# Patient Record
Sex: MALE | Race: WHITE | NOT HISPANIC OR LATINO | Employment: UNEMPLOYED | URBAN - METROPOLITAN AREA
[De-identification: names, ages, dates, MRNs, and addresses within clinical notes are randomized per-mention and may not be internally consistent; named-entity substitution may affect disease eponyms.]

---
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
## 2024-10-20 ENCOUNTER — HOSPITAL ENCOUNTER (EMERGENCY)
Facility: HOSPITAL | Age: 24
Discharge: HOME | End: 2024-10-20
Attending: EMERGENCY MEDICINE
Payer: COMMERCIAL

## 2024-10-20 VITALS
HEIGHT: 70 IN | DIASTOLIC BLOOD PRESSURE: 99 MMHG | WEIGHT: 190 LBS | RESPIRATION RATE: 18 BRPM | TEMPERATURE: 100 F | SYSTOLIC BLOOD PRESSURE: 153 MMHG | BODY MASS INDEX: 27.2 KG/M2 | HEART RATE: 115 BPM | OXYGEN SATURATION: 98 %

## 2024-10-20 DIAGNOSIS — B97.89 ACUTE VIRAL TONSILLITIS: Primary | ICD-10-CM

## 2024-10-20 DIAGNOSIS — J03.80 ACUTE VIRAL TONSILLITIS: Primary | ICD-10-CM

## 2024-10-20 PROCEDURE — 99283 EMERGENCY DEPT VISIT LOW MDM: CPT

## 2024-10-20 RX ORDER — DEXAMETHASONE SODIUM PHOSPHATE 4 MG/ML
10 INJECTION, SOLUTION INTRA-ARTICULAR; INTRALESIONAL; INTRAMUSCULAR; INTRAVENOUS; SOFT TISSUE ONCE
Status: COMPLETED | OUTPATIENT
Start: 2024-10-20 | End: 2024-10-20

## 2024-10-20 NOTE — ED INITIAL ASSESSMENT (HPI)
Pt arrives through triage with       complaints of sore throat that started a couple days ago. Pt was seen at  w/ neg strep. Advised to come to the Ed for possible abscess of tonsils. No fevers at home.

## 2024-10-21 NOTE — ED PROVIDER NOTES
Patient Seen in: [facility] Emergency Department    History     Chief Complaint   Patient presents with    Sore Throat       HPI    Patient presents to the ED complaining of a sore throat for the past 3 days.  He had a negative strep test at immediate care and was told to come to the ED for further evaluation.  States that he is still eating drink.  No other complaints.    History reviewed. History reviewed. No pertinent past medical history.    History reviewed. History reviewed. No pertinent surgical history.      Medications :  Prescriptions Prior to Admission[1]     No family history on file.    Smoking Status:   Social History     Socioeconomic History    Marital status: Single   Tobacco Use    Smoking status: Some Days     Types: Cigarettes    Smokeless tobacco: Never   Vaping Use    Vaping status: Some Days   Substance and Sexual Activity    Alcohol use: Yes     Comment: occa    Drug use: Never       Constitutional and vital signs reviewed.      Social History and Family History elements reviewed from today, pertinent positives to the presenting problem noted.    Physical Exam     ED Triage Vitals [10/20/24 1811]   BP (!) 153/97   Pulse 115   Resp 18   Temp 99.5 °F (37.5 °C)   Temp src Temporal   SpO2 98 %   O2 Device None (Room air)       All measures to prevent infection transmission during my interaction with the patient were taken. Handwashing was performed prior to and after the exam.  Stethoscope and any equipment used during my examination was cleaned with super sani-cloth germicidal wipes following the exam.     Physical Exam  Vitals and nursing note reviewed.   Constitutional:       General: He is not in acute distress.     Appearance: He is well-developed.   HENT:      Head: Normocephalic and atraumatic.      Mouth/Throat:      Tonsils: Tonsillar exudate present. No tonsillar abscesses.      Comments: Tonsils enlarged bilaterally with positive exudates and erythema.  No trismus or voice  change, no swelling of the soft palate.  Eyes:      General:         Right eye: No discharge.         Left eye: No discharge.      Conjunctiva/sclera: Conjunctivae normal.   Neck:      Trachea: No tracheal deviation.   Cardiovascular:      Rate and Rhythm: Normal rate.   Pulmonary:      Effort: Pulmonary effort is normal. No respiratory distress.      Breath sounds: No stridor.   Abdominal:      General: There is no distension.      Palpations: Abdomen is soft.   Musculoskeletal:         General: No deformity.   Skin:     General: Skin is warm and dry.   Neurological:      Mental Status: He is alert and oriented to person, place, and time.   Psychiatric:         Mood and Affect: Mood normal.         Behavior: Behavior normal.         ED Course      Labs Reviewed - No data to display    As Interpreted by me    Imaging Results Available and Reviewed while in ED: No results found.  ED Medications Administered:   Medications   dexamethasone (Decadron) 4 mg/mL vial as ORAL solution 10 mg (10 mg Oral Given 10/20/24 2005)         MDM     Vitals:    10/20/24 1811 10/20/24 1813   BP: (!) 153/97 (!) 153/99   Pulse: 115    Resp: 18    Temp: 99.5 °F (37.5 °C)    TempSrc: Temporal    SpO2: 98%    Weight: 86.2 kg    Height: 177.8 cm (5' 10\")      *I personally reviewed and interpreted all ED vitals.    Pulse Ox: 98%, Room air, Normal   Differential Diagnosis/ Diagnostic Considerations: Tonsillitis, pharyngitis, other    Complicating Factors: The patient already has does not have a problem list on file. to contribute to the complexity of this ED evaluation.    Medical Decision Making  The patient presents to the ED with tonsillitis symptoms.  No concern clinically for retropharyngeal or peritonsillar abscess.  Given Decadron in the ED and stable for discharge with continued supportive care.    Problems Addressed:  Acute viral tonsillitis: acute illness or injury        Condition upon leaving the department: Stable    Disposition  and Plan     Clinical Impression:  1. Acute viral tonsillitis        Disposition:  Discharge    Follow-up:  Ravindra Robison MD  [address] [place] [id number] [phone]    Schedule an appointment as soon as possible for a visit in 2 day(s)  As needed      Medications Prescribed:  There are no discharge medications for this patient.                       [1] (Not in a hospital admission)